# Patient Record
Sex: MALE | Race: OTHER
[De-identification: names, ages, dates, MRNs, and addresses within clinical notes are randomized per-mention and may not be internally consistent; named-entity substitution may affect disease eponyms.]

---

## 2020-12-04 ENCOUNTER — APPOINTMENT (OUTPATIENT)
Dept: PULMONOLOGY | Facility: HOSPITAL | Age: 55
End: 2020-12-04
Payer: COMMERCIAL

## 2020-12-04 VITALS — HEIGHT: 68 IN | WEIGHT: 246 LBS | BODY MASS INDEX: 37.28 KG/M2

## 2020-12-04 DIAGNOSIS — R06.83 SNORING: ICD-10-CM

## 2020-12-04 DIAGNOSIS — I10 ESSENTIAL (PRIMARY) HYPERTENSION: ICD-10-CM

## 2020-12-04 PROBLEM — Z00.00 ENCOUNTER FOR PREVENTIVE HEALTH EXAMINATION: Status: ACTIVE | Noted: 2020-12-04

## 2020-12-04 PROCEDURE — 99243 OFF/OP CNSLTJ NEW/EST LOW 30: CPT | Mod: GT

## 2020-12-04 RX ORDER — AMLODIPINE BESYLATE 5 MG/1
5 TABLET ORAL
Refills: 0 | Status: ACTIVE | COMMUNITY

## 2020-12-04 NOTE — REASON FOR VISIT
[Home] : at home, [unfilled] , at the time of the visit. [Medical Office: ([facility])___] : at the medical office located in  [Verbal consent obtained from patient] : the patient, [unfilled] [Initial Evaluation] : an initial evaluation [FreeTextEntry1] : sleep apnea

## 2020-12-04 NOTE — ASSESSMENT
[FreeTextEntry1] : Patient has symptoms suggestive of sleep apnea. Will order a sleep study. Discussed about details of the study and possible treatment options.\par Explained to patient about sedatives and pain medications worsening the degree of apnea.\par

## 2020-12-04 NOTE — HISTORY OF PRESENT ILLNESS
[FreeTextEntry1] : 55 year old man  with history of htn is here in the sleep center to address excessive snoring and restless sleep.  Patient is sleepy with [place] sleepiness score of 11 .  Patient has very loud snoring which disturbs his wife, does not have any witnessed apneas.  Patient's bedtime is around 12 -1 AM wakes up in the morning around 7 AM.  He feels rested when he wakes up.  Patient drinks 2-3 cups of coffee during the daytime, patient does not have any headaches or nocturia.  He is not sleepy while driving.\par STOPBANG score - 4 (moderate risk for apnea)\par

## 2021-05-06 ENCOUNTER — APPOINTMENT (OUTPATIENT)
Dept: PULMONOLOGY | Facility: HOSPITAL | Age: 56
End: 2021-05-06
Payer: COMMERCIAL

## 2021-05-06 PROCEDURE — 99212 OFFICE O/P EST SF 10 MIN: CPT | Mod: 95

## 2021-05-06 NOTE — HISTORY OF PRESENT ILLNESS
[FreeTextEntry1] : 55 year old man  with history of htn, rosemary is here in the sleep center to address sleep apnea.  Patient is sleepy with [place] sleepiness score of 11 .  Patient has very loud snoring which disturbs his wife, does not have any witnessed apneas.  \par \par Due to above symptoms patient underwent a sleep study which showed severe obstructive sleep apnea, we gave him a ResMed CPAP machine.  Patient had some personal issues that he is going through, he said he will start using cpap and will be more compliant going forward.  I asked him to follow-up with me in a month to adjust the pressures.  Today we went over the cleaning process for the machine\par

## 2021-05-06 NOTE — ASSESSMENT
[FreeTextEntry1] : 55-year-old man with severe obstructive sleep apnea on CPAP patient will start using the CPAP and he is willing to be more compliant.  We will look at the download data in few weeks time to adjust the pressures.

## 2021-05-06 NOTE — REASON FOR VISIT
[Home] : at home, [unfilled] , at the time of the visit. [Medical Office: ([facility])___] : at the medical office located in  [Verbal consent obtained from patient] : the patient, [unfilled] [Follow-Up] : a follow-up visit [FreeTextEntry1] : sleep apnea

## 2021-10-14 ENCOUNTER — APPOINTMENT (OUTPATIENT)
Dept: PULMONOLOGY | Facility: CLINIC | Age: 56
End: 2021-10-14
Payer: COMMERCIAL

## 2021-10-14 VITALS — WEIGHT: 246 LBS | BODY MASS INDEX: 37.28 KG/M2 | HEIGHT: 68 IN

## 2021-10-14 DIAGNOSIS — G47.33 OBSTRUCTIVE SLEEP APNEA (ADULT) (PEDIATRIC): ICD-10-CM

## 2021-10-14 PROCEDURE — 99212 OFFICE O/P EST SF 10 MIN: CPT | Mod: 95

## 2021-10-14 NOTE — HISTORY OF PRESENT ILLNESS
[FreeTextEntry1] : 56 year old man  with history of htn, rosemary is here in the sleep center to address sleep apnea.  Patient is sleepy with [place] sleepiness score of 11 .  Patient has very loud snoring which disturbs his wife, does not have any witnessed apneas.  \par \par Patient underwent a sleep study which showed severe obstructive sleep apnea, we gave him a ResMed CPAP machine.  Patient had some personal issues that he is going through as his dad passed away, then he had mask issues, eventually he is able to figure out a right mask.\par \par He is compliant and benefitted with cpap.\par Will appeal to insurance about coverage, as he failed to show compliance in the first 3 months.

## 2021-10-14 NOTE — ASSESSMENT
[FreeTextEntry1] : 56-year-old man with obstructive sleep apnea and hypertension he is on a ResMed CPAP machine.\par \par Patient initially had personal issues and then mask issues that he was not able to use CPAP in the beginning.  Now he is able to find a mask that works for him and is using it.  We will appeal the insurance as he failed a compliance in the beginning.  Patient wants to keep the machine and use it every night.

## 2021-10-14 NOTE — REASON FOR VISIT
[Home] : at home, [unfilled] , at the time of the visit. [Medical Office: ([facility])___] : at the medical office located in  [Verbal consent obtained from patient] : the patient, [unfilled] [Follow-Up] : a follow-up visit [Sleep Apnea] : sleep apnea

## 2022-10-05 ENCOUNTER — APPOINTMENT (OUTPATIENT)
Dept: PEDIATRIC ORTHOPEDIC SURGERY | Facility: CLINIC | Age: 57
End: 2022-10-05

## 2022-10-05 VITALS — BODY MASS INDEX: 38.8 KG/M2 | HEIGHT: 68 IN | WEIGHT: 256 LBS

## 2022-10-05 DIAGNOSIS — M65.342 TRIGGER FINGER, LEFT RING FINGER: ICD-10-CM

## 2022-10-05 PROCEDURE — 99202 OFFICE O/P NEW SF 15 MIN: CPT | Mod: 25

## 2022-10-05 PROCEDURE — 20550 NJX 1 TENDON SHEATH/LIGAMENT: CPT | Mod: F3

## 2022-10-07 PROBLEM — M65.342 TRIGGER RING FINGER OF LEFT HAND: Status: ACTIVE | Noted: 2022-10-07

## 2022-10-07 NOTE — ASSESSMENT
[FreeTextEntry1] : Left fourth trigger finger\par \par I explained to the patient the nature of the problem as well as the potential for surgical intervention.  I discussed with him the type of surgery as well as potential risks and complications.  All questions have been answered.

## 2022-10-07 NOTE — HISTORY OF PRESENT ILLNESS
[FreeTextEntry1] : This 57-year-old male is here for evaluation of a left fourth trigger finger.  Patient was seen by another physician and it was injected without relief of symptoms.  He has come to this office for orthopedic evaluation.  There has been no injury to the hand.

## 2022-10-07 NOTE — PROCEDURE
[FreeTextEntry1] : 1 mL of 1% plain lidocaine and 1 mL of 40 mg of Depo-Medrol have been injected into the left fourth finger proximal flexor tendon pulley

## 2022-10-07 NOTE — PHYSICAL EXAM
[de-identified] : On physical examination the patient has tenderness in the region of the proximal flexor tendon sheath of the left fourth finger.  He has obvious triggering of the finger as well.